# Patient Record
(demographics unavailable — no encounter records)

---

## 2024-10-15 NOTE — PHYSICAL EXAM
[Alert] : alert [Oriented x 3] : ~L oriented x 3 [Well Nourished] : well nourished [Conjunctiva Non-injected] : conjunctiva non-injected [No Visual Lymphadenopathy] : no visual  lymphadenopathy [No Clubbing] : no clubbing [No Edema] : no edema [No Bromhidrosis] : no bromhidrosis [No Chromhidrosis] : no chromhidrosis [Declined] : declined [FreeTextEntry3] : Focused exam only (see below) per patient request:  pink edematous plaques with fine scale R forearm volar

## 2024-10-15 NOTE — HISTORY OF PRESENT ILLNESS
[FreeTextEntry1] : rash [de-identified] : LV march 2024 for fbse  #Rash on R forearm, itchy, on/off since august Has dogs at home Tried triamcinolone, fluocinonide, fluticasone (partial improvement) but not clear.  Given 5 days prednisone taper recently  Derm hx: sclerotherapy on thighs Personal hx of skin cancer: BCC on L eyebrow s/p Mohs 2006 Family hx of skin cancer: no Social hx: friends with Dr. MCCONNELL's parents; [employer]

## 2024-10-15 NOTE — ASSESSMENT
[FreeTextEntry1] : Notalgia paresthetica, back - Diagnosis, chronic nature, disease course, treatment options and goals of therapy discussed - Recommend PT for neck +/- back pain - c/w OTC original Sarna lotion 3-4x daily PRN itch. Keep cool in fridge for better efficacy - c/w triamcinolone 0.1% ointment BID to affected areas for upto 1 week on/1 week OFF cycles. SED  Allergic contact dermatitis, R forearm - new dx of uncertain prognosis - Diagnosis and treatment options discussed, including course of condition, expectations, and goals of therapy.  - Start betamethasone diprop augmented 0.05% ointment BID to AA for 2 weeks on, 1 week off cycles.  Strong topical steroids should generally not be used on the face, in armpits, or in other skin folds, unless specifically directed otherwise. Overuse of steroids can lead to thinning of the skin, appearance of red blood vessels, or discoloration of the skin.   - Will hold off prednisone taper for now given localized nature of rash

## 2024-10-31 NOTE — ASSESSMENT
[FreeTextEntry1] : #Allergic contact dermatitis, R forearm  subacute, flaring, uncertain prognosis - Diagnosis and treatment options discussed, including course of condition, expectations, and goals of therapy.  - Favoring partially treated inflammatory process, given used potent topical steroid without worsening, less likely fungal. Can consider lyme erythema marginatum given targetoid appearance but feel this is less likely as there is overlying scale, the targetoid appearance was formed after initially treating partially with steroids. Will test for this however PLAN  -- f/u Lyme titres  -- CONT betamethasone diprop augmented 0.05% ointment BID to AA but instructed to use every day for 3 weeks; stop earlier if resolved Strong topical steroids should generally not be used on the face, in armpits, or in other skin folds, unless specifically directed otherwise. Overuse of steroids can lead to thinning of the skin, appearance of red blood vessels, or discoloration of the skin.   - Will cont to hold off prednisone taper for now given localized nature of rash    RTC 4 weeks

## 2024-10-31 NOTE — HISTORY OF PRESENT ILLNESS
[FreeTextEntry1] : rash [de-identified] : #Rash on R forearm, itchy, on/off since august Has dogs at home Tried triamcinolone, fluocinonide, fluticasone (partial improvement) but not clear.  Given 5 days prednisone taper prior to LV 2 weeks ago - 10/30/24: LV 10/15/2024 for rash, favored ACD, now continuing to flare Has since used betamethasone but does not feel like she has been using consistently; rather, once daily PRN itch.  Derm hx: sclerotherapy on thighs; notalgia paresthetica Personal hx of skin cancer: BCC on L eyebrow s/p Mohs 2006 Family hx of skin cancer: no Social hx: friends with Dr. MCCONNELL's parents; [employer]

## 2024-10-31 NOTE — PHYSICAL EXAM
[Alert] : alert [Oriented x 3] : ~L oriented x 3 [Well Nourished] : well nourished [Conjunctiva Non-injected] : conjunctiva non-injected [No Visual Lymphadenopathy] : no visual  lymphadenopathy [No Clubbing] : no clubbing [No Edema] : no edema [No Bromhidrosis] : no bromhidrosis [No Chromhidrosis] : no chromhidrosis [Declined] : declined [FreeTextEntry3] : Focused exam only (see below) per patient request:  pink edematous annular plaques with areas of clearance but centrally with edematous plaque with fine scale R forearm volar

## 2024-10-31 NOTE — HISTORY OF PRESENT ILLNESS
[FreeTextEntry1] : rash [de-identified] : #Rash on R forearm, itchy, on/off since august Has dogs at home Tried triamcinolone, fluocinonide, fluticasone (partial improvement) but not clear.  Given 5 days prednisone taper prior to LV 2 weeks ago - 10/30/24: LV 10/15/2024 for rash, favored ACD, now continuing to flare Has since used betamethasone but does not feel like she has been using consistently; rather, once daily PRN itch.  Derm hx: sclerotherapy on thighs; notalgia paresthetica Personal hx of skin cancer: BCC on L eyebrow s/p Mohs 2006 Family hx of skin cancer: no Social hx: friends with Dr. MCCONNELL's parents; [employer]

## 2024-12-05 NOTE — PHYSICAL EXAM
[Alert] : alert [Oriented x 3] : ~L oriented x 3 [Well Nourished] : well nourished [Conjunctiva Non-injected] : conjunctiva non-injected [No Visual Lymphadenopathy] : no visual  lymphadenopathy [No Clubbing] : no clubbing [No Edema] : no edema [No Bromhidrosis] : no bromhidrosis [No Chromhidrosis] : no chromhidrosis [Declined] : declined [FreeTextEntry3] : Focused exam only (see below) per patient request:  scaly annular plaques with areas of clearance R forearm

## 2024-12-05 NOTE — ASSESSMENT
[FreeTextEntry1] : #Tinea corporis, R forearm Prev tx for contact dermatitis with worsening of rash; lyme titers negative - Diagnosis, chronic nature, disease course, treatment options and goals of therapy discussed - STOP betamethasone - START ketoconazole cream TID - START PO terbinafine 250mg daily x2 weeks. SED  RTC 2-3 weeks

## 2024-12-05 NOTE — HISTORY OF PRESENT ILLNESS
[FreeTextEntry1] : rash [de-identified] : #Rash on R forearm, itchy, on/off since august Has dogs at home Tried triamcinolone, fluocinonide, fluticasone (partial improvement) but not clear.  Given 5 days prednisone taper prior to LV 2 weeks ago - 10/30/24: LV 10/15/2024 for rash, favored ACD, now continuing to flare Has since used betamethasone but does not feel like she has been using consistently; rather, once daily PRN itch. - 12/5/24: pt notes itch is worse, no improvement with 3 weeks of betamethasone   Derm hx: sclerotherapy on thighs; notalgia paresthetica Personal hx of skin cancer: BCC on L eyebrow s/p Mohs 2006 Family hx of skin cancer: no Social hx: friends with Dr. MCCONNELL's parents; [employer]

## 2024-12-19 NOTE — PHYSICAL EXAM
[Alert] : alert [Oriented x 3] : ~L oriented x 3 [Well Nourished] : well nourished [Conjunctiva Non-injected] : conjunctiva non-injected [No Visual Lymphadenopathy] : no visual  lymphadenopathy [No Clubbing] : no clubbing [No Edema] : no edema [No Bromhidrosis] : no bromhidrosis [No Chromhidrosis] : no chromhidrosis [Declined] : declined [FreeTextEntry3] : Focused exam only (see below) per patient request:  pink light round patch R forearm; no longer scaly or raised  well-demarcated pink scaly square thin plaques on each of lower abdomen

## 2024-12-19 NOTE — HISTORY OF PRESENT ILLNESS
[FreeTextEntry1] : rash [de-identified] : #Rash on R forearm, itchy, on/off since august Has dogs at home Tried triamcinolone, fluocinonide, fluticasone (partial improvement) but not clear.  Given 5 days prednisone taper prior to LV 2 weeks ago - 10/30/24: LV 10/15/2024 for rash, favored ACD, now continuing to flare Has since used betamethasone but does not feel like she has been using consistently; rather, once daily PRN itch. - 12/5/24: pt notes itch is worse, no improvement with 3 weeks of betamethasone  - 12/19/24: improving with PO terbinafine x2 weeks and keto cream, still mildly itchy  #Rash x1 week, after she had leads for stress test   Derm hx: sclerotherapy on thighs; notalgia paresthetica Personal hx of skin cancer: BCC on L eyebrow s/p Mohs 2006 Family hx of skin cancer: no Social hx: friends with Dr. MCCONNELL's parents; [employer]

## 2025-04-01 NOTE — HISTORY OF PRESENT ILLNESS
[FreeTextEntry1] : spots [de-identified] : FBSE.  Spots scattered on body x years. Asymptomatic and unchanged. No alleviating/aggravating factors. Never been treated.  Derm hx: sclerotherapy on thighs; notalgia paresthetica; contact derm; tinea corporis R forearm Personal hx of skin cancer: BCC on L eyebrow s/p Mohs 2006 Family hx of skin cancer: no Social hx: friends with Dr. MCCONNELL's parents; [employer]

## 2025-04-01 NOTE — ASSESSMENT
[FreeTextEntry1] : #Actinic keratosis x1, pigmented on dorsal nose - The patient was informed of the pathophysiology of their lesions and their treatment course with liquid nitrogen (cryosurgery). Side effects include blister formation, hypopigmentation, and scarring, as well as permanent nail dystrophy if applicable. Patient was verbally consented and the lesions identified above were treated with liquid nitrogen freeze, thaw, freeze each cycle x 2. The patient tolerated the procedure well.  Wound care instructions, care of a blister with vaseline, signs and symptoms of infection were discussed in full.  The patient denies any questions at this time.  #Seborrheic Keratosis - These growths are benign - Related to genetics - these lesions run in families; NOT related to sun exposure - No treatment warranted unless inflamed; can use OTC Sarna lotion PRN itch  #Multiple benign nevi #Solar lentigo #Screening exam for skin cancer - no suspicious lesions on exam today - TBSE performed today - Advised sun protection. Recommended OTC sunscreen products (EltaMD/Neutrogena/La Roche Posay), including SPF30+ with broadband UV protection as well as proper use. Discussed OTC sun protective clothing - Counseled patient to monitor for changes, including mole monitoring and self-skin exams

## 2025-04-01 NOTE — PHYSICAL EXAM
[Alert] : alert [Oriented x 3] : ~L oriented x 3 [Well Nourished] : well nourished [Conjunctiva Non-injected] : conjunctiva non-injected [No Visual Lymphadenopathy] : no visual  lymphadenopathy [No Clubbing] : no clubbing [No Edema] : no edema [No Bromhidrosis] : no bromhidrosis [No Chromhidrosis] : no chromhidrosis [Full Body Skin Exam Performed] : performed [FreeTextEntry3] : General: Alert and oriented, in NAD.  All of the following were examined and were within normal limits, except as noted:   Scalp: Face, including eyelids, nose, lips, ears, oropharynx: Neck: Chest/Back/Abdomen: Bilateral Arms/Hands: Bilateral Legs/Feet: Buttocks, Genitalia, Anus/perineum:  	 Hair, Nails, Oral Mucosa, Eyes:    gritty hyperpigmented thin papule dorsal distal nose lentigines SKs homogenous nevi